# Patient Record
(demographics unavailable — no encounter records)

---

## 2025-03-23 NOTE — DISCUSSION/SUMMARY
[FreeTextEntry1] : 63-year-old male with postinfectious inflammatory cough.  He was given 2 weeks sample of Trelegy with continued use of albuterol on as-needed basis.  Treatment adjust will depend on symptomatic needs.  PFTs will be performed in the future.  He is to follow-up with his PMD as before.

## 2025-03-23 NOTE — HISTORY OF PRESENT ILLNESS
[Former] : former [< 20 pack-years] : < 20 pack-years [TextBox_4] : 63-year-old male presents for evaluation of intermittent productive cough for 1 month.  The patient initially had a "cold", associated with body aches, fever and productive cough.  He was seen in a walk-in clinic, treated with Z-Nic, cough suppressant and nebulized albuterol.  He denies prior pulmonary problems or treatment.  He has remote smoking history having quit 40 years ago. [YearQuit] : 1985 [TextBox_29] : Denies snoring, daytime somnolence, apneic episodes, AM headaches

## 2025-04-01 NOTE — HISTORY OF PRESENT ILLNESS
[Former] : former [< 20 pack-years] : < 20 pack-years [TextBox_4] : 63-year-old male with history of intermittent cough presents for follow-up.  Patient feels "better" post 2 weeks of Trelegy with occasional albuterol use.  Presently is on no treatment denying fever, chills, chest pain, hemoptysis, night sweats or weight loss but does complain of occasional coughing with exertion . [YearQuit] : 1985 [TextBox_29] : Denies snoring, daytime somnolence, apneic episodes, AM headaches